# Patient Record
Sex: MALE | Race: BLACK OR AFRICAN AMERICAN | NOT HISPANIC OR LATINO | RURAL
[De-identification: names, ages, dates, MRNs, and addresses within clinical notes are randomized per-mention and may not be internally consistent; named-entity substitution may affect disease eponyms.]

---

## 2024-06-27 ENCOUNTER — HOSPITAL ENCOUNTER (EMERGENCY)
Facility: HOSPITAL | Age: 44
Discharge: HOME | End: 2024-06-27

## 2024-06-27 VITALS
OXYGEN SATURATION: 100 % | HEART RATE: 88 BPM | RESPIRATION RATE: 16 BRPM | DIASTOLIC BLOOD PRESSURE: 97 MMHG | HEIGHT: 73 IN | SYSTOLIC BLOOD PRESSURE: 148 MMHG | BODY MASS INDEX: 21.47 KG/M2 | WEIGHT: 162 LBS | TEMPERATURE: 98 F

## 2024-06-27 DIAGNOSIS — H00.14 CHALAZION LEFT UPPER EYELID: Primary | ICD-10-CM

## 2024-06-27 RX ORDER — CEPHALEXIN 500 MG/1
500 CAPSULE ORAL 4 TIMES DAILY
Qty: 28 CAPSULE | Refills: 0 | Status: SHIPPED | OUTPATIENT
Start: 2024-06-27 | End: 2024-07-04

## 2024-06-27 RX ORDER — ERYTHROMYCIN 5 MG/G
OINTMENT OPHTHALMIC
Qty: 3.5 G | Refills: 0 | Status: SHIPPED | OUTPATIENT
Start: 2024-06-27

## 2024-06-27 NOTE — ED PROVIDER NOTES
Encounter Date: 6/27/2024       History     Chief Complaint   Patient presents with    Eye Problem     Pt c/o a lesion on left eye lid x6 months that has gotten larger.      Presented with c/o area of swelling to left eyelid that has been present for 7 mo but worsened over the last week. Denies injury, fever or chills, visual disturbance or pain. Has not seen a PCP because he doesn't have one.      Review of patient's allergies indicates:  No Known Allergies  History reviewed. No pertinent past medical history.  History reviewed. No pertinent surgical history.  No family history on file.  Social History     Tobacco Use    Smoking status: Some Days     Types: Cigarettes    Smokeless tobacco: Never   Substance Use Topics    Alcohol use: Not Currently    Drug use: Not Currently     Review of Systems   Constitutional:  Negative for activity change, appetite change and fever.   HENT: Negative.     Eyes:  Negative for pain, discharge, redness, itching and visual disturbance.        Area of swelling to left upper eyelid x 7 mo   Respiratory: Negative.     Cardiovascular: Negative.    Gastrointestinal: Negative.    Genitourinary: Negative.    Musculoskeletal: Negative.    Neurological: Negative.  Negative for headaches.   Psychiatric/Behavioral: Negative.     All other systems reviewed and are negative.      Physical Exam     Initial Vitals [06/27/24 0912]   BP Pulse Resp Temp SpO2   (!) 148/97 88 16 98.1 °F (36.7 °C) 100 %      MAP       --         Physical Exam    Nursing note and vitals reviewed.  Constitutional: He appears well-developed and well-nourished. No distress.   HENT:   Head: Normocephalic.   Right Ear: External ear normal.   Left Ear: External ear normal.   Nose: Nose normal.   Mouth/Throat: Oropharynx is clear and moist.   Eyes: Conjunctivae and EOM are normal. Pupils are equal, round, and reactive to light.       Neck: Neck supple.   Cardiovascular:  Normal rate, regular rhythm, normal heart sounds and  intact distal pulses.           No murmur heard.  Pulmonary/Chest: Breath sounds normal.   Abdominal: Abdomen is soft.   Musculoskeletal:         General: Normal range of motion.      Cervical back: Neck supple.     Neurological: He is alert and oriented to person, place, and time. GCS score is 15. GCS eye subscore is 4. GCS verbal subscore is 5. GCS motor subscore is 6.   Skin: Skin is warm and dry. Capillary refill takes less than 2 seconds.   Psychiatric: He has a normal mood and affect.         Medical Screening Exam   See Full Note    ED Course   Procedures  Labs Reviewed - No data to display       Imaging Results    None          Medications - No data to display  Medical Decision Making  Presented with c/o area of swelling to left eyelid that has been present for 7 mo but worsened over the last week. Denies injury, fever or chills, visual disturbance or pain. Has not seen a PCP because he doesn't have one.    Risk  Prescription drug management.  Risk Details: I&D to left upper lid. Return of large amt of purulent drng. Pt is stable. Afebrile. Rx for erythromycin oint and Cephalexin po. /97, HR 88, RR 16, O2 sat 100% on RA.  Instructed on home care, med use, follow-up and return precautions. Discharged home with detailed written instructions provided.                                        Clinical Impression:   Final diagnoses:  [H00.14] Chalazion left upper eyelid (Primary)        ED Disposition Condition    Discharge Stable          ED Prescriptions       Medication Sig Dispense Start Date End Date Auth. Provider    erythromycin (ROMYCIN) ophthalmic ointment Place a 1/2 inch ribbon of ointment into the left lower eyelid. 3.5 g 6/27/2024 -- Ashtyn Adams NP    cephALEXin (KEFLEX) 500 MG capsule Take 1 capsule (500 mg total) by mouth 4 (four) times daily. for 7 days 28 capsule 6/27/2024 7/4/2024 Ashtyn Adams NP          Follow-up Information       Follow up With Specialties Details Why Contact  Info    [facility]  In 1 week If still present     [facility] - Emergency Department Emergency Medicine  If symptoms worsen [address] [facility] [address] [id number] [phone]             Ashtyn Adams NP  06/27/24 1509

## 2024-06-27 NOTE — DISCHARGE INSTRUCTIONS
Take Cephalexin and use erythromycin eye oint 4 times perday as prescribed for all 7 days. Apply warm compresses to left eyelid for 20 minutes 4-6 times per day. Follow-up with a PCP of your choosing or make appointment with [facility] in 1 week. Return to the ED for new or worsening symptoms.